# Patient Record
Sex: MALE | Race: WHITE | NOT HISPANIC OR LATINO
[De-identification: names, ages, dates, MRNs, and addresses within clinical notes are randomized per-mention and may not be internally consistent; named-entity substitution may affect disease eponyms.]

---

## 2022-05-11 PROBLEM — Z00.00 ENCOUNTER FOR PREVENTIVE HEALTH EXAMINATION: Status: ACTIVE | Noted: 2022-05-11

## 2022-05-12 ENCOUNTER — TRANSCRIPTION ENCOUNTER (OUTPATIENT)
Age: 77
End: 2022-05-12

## 2022-05-12 ENCOUNTER — APPOINTMENT (OUTPATIENT)
Dept: ORTHOPEDIC SURGERY | Facility: CLINIC | Age: 77
End: 2022-05-12
Payer: MEDICARE

## 2022-05-12 VITALS — BODY MASS INDEX: 31.25 KG/M2 | WEIGHT: 211 LBS | HEIGHT: 69 IN

## 2022-05-12 DIAGNOSIS — Z78.9 OTHER SPECIFIED HEALTH STATUS: ICD-10-CM

## 2022-05-12 DIAGNOSIS — E78.00 PURE HYPERCHOLESTEROLEMIA, UNSPECIFIED: ICD-10-CM

## 2022-05-12 PROCEDURE — 73080 X-RAY EXAM OF ELBOW: CPT | Mod: LT

## 2022-05-12 PROCEDURE — 99203 OFFICE O/P NEW LOW 30 MIN: CPT

## 2022-05-12 RX ORDER — CLINDAMYCIN HYDROCHLORIDE 300 MG/1
300 CAPSULE ORAL EVERY 6 HOURS
Qty: 28 | Refills: 0 | Status: ACTIVE | COMMUNITY
Start: 2022-05-12 | End: 1900-01-01

## 2022-05-13 ENCOUNTER — APPOINTMENT (OUTPATIENT)
Dept: ORTHOPEDIC SURGERY | Facility: CLINIC | Age: 77
End: 2022-05-13

## 2022-05-16 PROBLEM — Z78.9 CURRENT NON-DRINKER OF ALCOHOL: Status: ACTIVE | Noted: 2022-05-12

## 2022-05-16 PROBLEM — Z78.9 NON-SMOKER: Status: ACTIVE | Noted: 2022-05-12

## 2022-05-18 ENCOUNTER — APPOINTMENT (OUTPATIENT)
Dept: ORTHOPEDIC SURGERY | Facility: CLINIC | Age: 77
End: 2022-05-18

## 2022-05-18 NOTE — HISTORY OF PRESENT ILLNESS
[0] : 0 [de-identified] : 05/12/2022 Mr. BRYNN HIGHTOWER,  76 year male , presents today for left elbow. Reports left elbow pain and swelling. Reports that last night he leaned on the elbow and a spot opened up and a clear fluid was draining out. Reports history of olecranon bursitis about 4 years ago, drained by Dr. Tobin without any issues. \par  [] : no [FreeTextEntry1] : lt elbow  [FreeTextEntry5] :  BRYNN HIGHTOWER is a 76 year male who is here today for lt elbow pain. he has been having it for 2 days. stated its more soreness than pain.

## 2022-05-18 NOTE — DISCUSSION/SUMMARY
[de-identified] : 76m with olecranon bursitis. drained upon exploration of wound.  approx 10cc of bursal fluid evacuated. no pus noted. sterile dressing applied\par \par 1) rx for clindamycin\par 2) advised to keep covered and clean, advised to observe for any signs of infection\par 3) rtc 1 week for re-eval \par \par Entered by Avelina Cancino acting as scribe.\par

## 2022-05-19 ENCOUNTER — APPOINTMENT (OUTPATIENT)
Dept: ORTHOPEDIC SURGERY | Facility: CLINIC | Age: 77
End: 2022-05-19
Payer: MEDICARE

## 2022-05-19 VITALS — HEIGHT: 69 IN | BODY MASS INDEX: 31.25 KG/M2 | WEIGHT: 211 LBS

## 2022-05-19 PROCEDURE — 99213 OFFICE O/P EST LOW 20 MIN: CPT

## 2022-05-19 NOTE — PROCEDURE
[Medium Joint Injection] : medium joint injection [Left] : of the left [Inflammation] : inflammation [Alcohol] : alcohol [Betadine] : betadine [Ethyl Chloride sprayed topically] : ethyl chloride sprayed topically [Sterile technique used] : sterile technique used [Effusion] : effusion [de-identified] : 25cc

## 2022-05-19 NOTE — DISCUSSION/SUMMARY
[de-identified] : 76m with olecranon bursitis. drained upon exploration of wound.  \par 1) aspiration today  - 25cc\par 2) advised to keep covered and clean, advised to observe for any signs of infection\par 3) rtc 2 weeks\par \par Entered by Avelina Cancino acting as scribe.\par

## 2022-05-19 NOTE — HISTORY OF PRESENT ILLNESS
[0] : 0 [de-identified] : 05/19/22: Here to f/up left elbow. Completing the rx of antibiotic today. Denies any f/c/s. Has seen a return of fluid over the left elbow. \par 05/12/2022 Mr. BRYNN HIGHTOWER,  76 year male , presents today for left elbow. Reports left elbow pain and swelling. Reports that last night he leaned on the elbow and a spot opened up and a clear fluid was draining out. Reports history of olecranon bursitis about 4 years ago, drained by Dr. Tobin without any issues. \par  [] : no [FreeTextEntry1] : lt elbow  [FreeTextEntry5] :  BRYNN HIGHTOWER is a 76 year male who is here today for lt elbow pain. He is doing same since last visit. feels more soreness than pain.

## 2022-05-23 ENCOUNTER — APPOINTMENT (OUTPATIENT)
Dept: ORTHOPEDIC SURGERY | Facility: CLINIC | Age: 77
End: 2022-05-23
Payer: MEDICARE

## 2022-05-23 VITALS — BODY MASS INDEX: 31.25 KG/M2 | HEIGHT: 69 IN | WEIGHT: 211 LBS

## 2022-05-23 PROCEDURE — J3490M: CUSTOM

## 2022-05-23 PROCEDURE — 99213 OFFICE O/P EST LOW 20 MIN: CPT | Mod: 25

## 2022-05-23 PROCEDURE — 20605 DRAIN/INJ JOINT/BURSA W/O US: CPT

## 2022-05-23 NOTE — DISCUSSION/SUMMARY
[de-identified] : 76m with left olecranon bursitis\par 1) csi / aspiration left elbow\par 2) continue to observe for any signs of infection\par 3) cryotherapy, rest and activity modification\par \par Entered by Avelina Cancino acting as scribe.\par

## 2022-05-23 NOTE — PROCEDURE
[Medium Joint Injection] : medium joint injection [Left] : of the left [Elbow Joint] : elbow joint [Pain] : pain [Inflammation] : inflammation [Alcohol] : alcohol [Betadine] : betadine [Ethyl Chloride sprayed topically] : ethyl chloride sprayed topically [Sterile technique used] : sterile technique used [___ cc    1%] : Lidocaine ~Vcc of 1%  [___ cc    0.5%] : Bupivacaine (Marcaine) ~Vcc of 0.5%  [___ cc    40mg] : Triamcinolone (Kenalog) ~Vcc of 40 mg  [Effusion] : effusion [de-identified] : 20cc [de-identified] : bursal fluid

## 2022-05-23 NOTE — HISTORY OF PRESENT ILLNESS
[0] : 0 [de-identified] : 05/23/22: Here to f/up left elbow olecranon bursitis. Aspiration at last visit. Denies any f/c/s. Has been experiencing a return of fluid over the left elbow. \par 05/19/22: Here to f/up left elbow. Completing the rx of antibiotic today. Denies any f/c/s. Has seen a return of fluid over the left elbow. \par 05/12/2022 Mr. BRYNN HIGHTOWER,  76 year male , presents today for left elbow. Reports left elbow pain and swelling. Reports that last night he leaned on the elbow and a spot opened up and a clear fluid was draining out. Reports history of olecranon bursitis about 4 years ago, drained by Dr. Tobin without any issues. \par  [FreeTextEntry1] : lt elbow [FreeTextEntry5] :  BRYNN HIGHTOWER is a 76 year male who is here today for his lt elbow, has soreness and swelling.

## 2022-06-08 ENCOUNTER — APPOINTMENT (OUTPATIENT)
Dept: ORTHOPEDIC SURGERY | Facility: CLINIC | Age: 77
End: 2022-06-08
Payer: MEDICARE

## 2022-06-08 VITALS — BODY MASS INDEX: 31.25 KG/M2 | WEIGHT: 211 LBS | HEIGHT: 69 IN

## 2022-06-08 DIAGNOSIS — M25.522 PAIN IN LEFT ELBOW: ICD-10-CM

## 2022-06-08 DIAGNOSIS — M70.22 OLECRANON BURSITIS, LEFT ELBOW: ICD-10-CM

## 2022-06-08 PROCEDURE — 99213 OFFICE O/P EST LOW 20 MIN: CPT

## 2022-06-08 RX ORDER — NAPROXEN 500 MG/1
500 TABLET ORAL
Qty: 60 | Refills: 0 | Status: ACTIVE | COMMUNITY
Start: 2022-06-08 | End: 1900-01-01

## 2022-06-09 NOTE — PROCEDURE
[Medium Joint Injection] : medium joint injection [Left] : of the left [Elbow Joint] : elbow joint [Pain] : pain [Inflammation] : inflammation [Alcohol] : alcohol [Betadine] : betadine [Ethyl Chloride sprayed topically] : ethyl chloride sprayed topically [Sterile technique used] : sterile technique used [___ cc    1%] : Lidocaine ~Vcc of 1%  [___ cc    0.5%] : Bupivacaine (Marcaine) ~Vcc of 0.5%  [___ cc    40mg] : Triamcinolone (Kenalog) ~Vcc of 40 mg  [Effusion] : effusion [de-identified] : 20cc [de-identified] : bursal fluid

## 2022-06-09 NOTE — PHYSICAL EXAM
[Left] : left elbow [] : tenderness over olecranon [FreeTextEntry3] : swelling [TWNoteComboBox4] : extension 0 degrees

## 2022-06-09 NOTE — HISTORY OF PRESENT ILLNESS
[0] : 0 [de-identified] : 6/8/22: Here for f/u left elbow. C/O increased swelling and soreness. Aspiration last visit. \par 05/23/22: Here to f/up left elbow olecranon bursitis. Aspiration at last visit. Denies any f/c/s. Has been experiencing a return of fluid over the left elbow. \par 05/19/22: Here to f/up left elbow. Completing the rx of antibiotic today. Denies any f/c/s. Has seen a return of fluid over the left elbow. \par 05/12/2022 Mr. BRYNN HIGHTOWER,  76 year male , presents today for left elbow. Reports left elbow pain and swelling. Reports that last night he leaned on the elbow and a spot opened up and a clear fluid was draining out. Reports history of olecranon bursitis about 4 years ago, drained by Dr. Tobin without any issues. \par  [FreeTextEntry1] : lt elbow [FreeTextEntry5] :  BRYNN HIGHTOWER is a 76 year male who is here today for his lt elbow, has soreness and swelling.

## 2022-06-09 NOTE — DISCUSSION/SUMMARY
[de-identified] : 76m with left olecranon bursitis\par 1) reviewed risks of infection with repeated aspirations. Recommend he continue nsaids and compression dressing, giving this 3-4 weeks of time. . \par 2) continue to observe for any signs of infection\par 3) cryotherapy, rest and activity modification\par 4) if does not reduce will obtain MRI to evaluate if swelling persists, possible bursectomy\par 5) Return 4 weeks.\par \par \par Entered by Avelina Cancino acting as scribe.\par

## 2022-08-08 ENCOUNTER — APPOINTMENT (OUTPATIENT)
Dept: ORTHOPEDIC SURGERY | Facility: CLINIC | Age: 77
End: 2022-08-08